# Patient Record
Sex: FEMALE | URBAN - METROPOLITAN AREA
[De-identification: names, ages, dates, MRNs, and addresses within clinical notes are randomized per-mention and may not be internally consistent; named-entity substitution may affect disease eponyms.]

---

## 2022-01-18 ENCOUNTER — TELEPHONE (OUTPATIENT)
Dept: TRANSPLANT | Facility: CLINIC | Age: 57
End: 2022-01-18

## 2022-01-18 NOTE — TELEPHONE ENCOUNTER
Kaitlin Waters called and stated that she is interested in becoming a living donor for her brother.  Medical and social history obtained.  Patient reports history of diabetes.  Declined as a potential donor.  All questions answered.  Patient verbalized understanding to all.